# Patient Record
Sex: FEMALE | Race: WHITE | NOT HISPANIC OR LATINO | URBAN - METROPOLITAN AREA
[De-identification: names, ages, dates, MRNs, and addresses within clinical notes are randomized per-mention and may not be internally consistent; named-entity substitution may affect disease eponyms.]

---

## 2023-04-04 ENCOUNTER — TELEPHONE (OUTPATIENT)
Dept: PRIMARY CARE | Facility: CLINIC | Age: 58
End: 2023-04-04
Payer: COMMERCIAL

## 2023-04-04 DIAGNOSIS — G25.81 RESTLESS LEGS: Primary | ICD-10-CM

## 2023-04-04 RX ORDER — CITALOPRAM 40 MG/1
1 TABLET, FILM COATED ORAL DAILY
COMMUNITY
End: 2023-04-17 | Stop reason: SDUPTHER

## 2023-04-04 RX ORDER — BUPROPION HYDROCHLORIDE 150 MG/1
1 TABLET ORAL DAILY
COMMUNITY
End: 2023-04-17 | Stop reason: SDUPTHER

## 2023-04-04 RX ORDER — PRAMIPEXOLE DIHYDROCHLORIDE 0.5 MG/1
0.5 TABLET ORAL NIGHTLY
COMMUNITY
End: 2023-04-06 | Stop reason: SDUPTHER

## 2023-04-04 NOTE — TELEPHONE ENCOUNTER
Name of medication & dose: BUPROPION  MG + CITALOPRAM HYDROBROMIDE 40 MG + PRAMIPEXOLE DIHYDROCHLORIDE 0.5 MG  Quantity & refills requested: 90 + 90 + 90  Amount of medication remaining: ABOUT A WEEK  If out of medication, for how long? N/A    Last office visit: 7/15/22  Future appointment? 7/17/23    Pharmacy verified? YES ([facility]: [id number] [facility])    The patient was informed the requested medication request will be processed within 3 business days unless they are contacted by a staff member to advise otherwise.

## 2023-04-06 PROBLEM — G25.81 RESTLESS LEGS: Status: ACTIVE | Noted: 2023-04-06

## 2023-04-06 PROBLEM — F33.0 MILD EPISODE OF RECURRENT MAJOR DEPRESSIVE DISORDER (CMS-HCC): Status: ACTIVE | Noted: 2023-04-06

## 2023-04-06 PROBLEM — F41.9 ANXIETY: Status: ACTIVE | Noted: 2023-04-06

## 2023-04-06 PROBLEM — M25.512 LEFT SHOULDER PAIN: Status: ACTIVE | Noted: 2023-04-06

## 2023-04-06 PROBLEM — F33.9 RECURRENT MAJOR DEPRESSIVE DISORDER (CMS-HCC): Status: ACTIVE | Noted: 2023-04-06

## 2023-04-06 PROBLEM — E55.9 VITAMIN D DEFICIENCY: Status: ACTIVE | Noted: 2023-04-06

## 2023-04-06 RX ORDER — PRAMIPEXOLE DIHYDROCHLORIDE 0.5 MG/1
0.5 TABLET ORAL NIGHTLY
Qty: 90 TABLET | Refills: 0 | Status: SHIPPED | OUTPATIENT
Start: 2023-04-06 | End: 2023-04-17 | Stop reason: SDUPTHER

## 2023-04-06 NOTE — TELEPHONE ENCOUNTER
The patient by phone.  She really only needs the restless leg medication, have months worth of the other 2.  She declined the request for all 3 because of needing to change pharmacies.  Based on her recent labs and the fact that the restless leg medication was new asked her to please make an appointment in the upcoming month as a problem focused visit.  This is something we talked about at her wellness visit last year and it is not okay to save all of her problems for her wellness visit.  She understood and said she would make an appointment.    Ariadna Barrett MD

## 2023-04-07 ENCOUNTER — APPOINTMENT (OUTPATIENT)
Dept: PRIMARY CARE | Facility: CLINIC | Age: 58
End: 2023-04-07
Payer: COMMERCIAL

## 2023-04-17 ENCOUNTER — OFFICE VISIT (OUTPATIENT)
Dept: PRIMARY CARE | Facility: CLINIC | Age: 58
End: 2023-04-17
Payer: COMMERCIAL

## 2023-04-17 VITALS
SYSTOLIC BLOOD PRESSURE: 131 MMHG | WEIGHT: 187 LBS | HEART RATE: 64 BPM | OXYGEN SATURATION: 96 % | BODY MASS INDEX: 30.18 KG/M2 | DIASTOLIC BLOOD PRESSURE: 77 MMHG

## 2023-04-17 DIAGNOSIS — E55.9 VITAMIN D DEFICIENCY: ICD-10-CM

## 2023-04-17 DIAGNOSIS — G25.81 RESTLESS LEGS: ICD-10-CM

## 2023-04-17 DIAGNOSIS — E78.2 MIXED HYPERLIPIDEMIA: Primary | ICD-10-CM

## 2023-04-17 DIAGNOSIS — F33.0 MILD EPISODE OF RECURRENT MAJOR DEPRESSIVE DISORDER (CMS-HCC): ICD-10-CM

## 2023-04-17 PROCEDURE — 90750 HZV VACC RECOMBINANT IM: CPT | Performed by: FAMILY MEDICINE

## 2023-04-17 PROCEDURE — 99213 OFFICE O/P EST LOW 20 MIN: CPT | Performed by: FAMILY MEDICINE

## 2023-04-17 PROCEDURE — 1036F TOBACCO NON-USER: CPT | Performed by: FAMILY MEDICINE

## 2023-04-17 PROCEDURE — 90471 IMMUNIZATION ADMIN: CPT | Performed by: FAMILY MEDICINE

## 2023-04-17 RX ORDER — BUPROPION HYDROCHLORIDE 150 MG/1
150 TABLET ORAL DAILY
Qty: 90 TABLET | Refills: 3 | Status: SHIPPED | OUTPATIENT
Start: 2023-04-17 | End: 2024-04-24 | Stop reason: SDUPTHER

## 2023-04-17 RX ORDER — PRAMIPEXOLE DIHYDROCHLORIDE 0.5 MG/1
0.5 TABLET ORAL NIGHTLY
Qty: 90 TABLET | Refills: 3 | Status: SHIPPED | OUTPATIENT
Start: 2023-04-17 | End: 2024-04-24 | Stop reason: SDUPTHER

## 2023-04-17 RX ORDER — CITALOPRAM 40 MG/1
40 TABLET, FILM COATED ORAL DAILY
Qty: 90 TABLET | Refills: 3 | Status: SHIPPED | OUTPATIENT
Start: 2023-04-17 | End: 2024-04-24 | Stop reason: SDUPTHER

## 2023-04-17 RX ORDER — ATORVASTATIN CALCIUM 10 MG/1
10 TABLET, FILM COATED ORAL DAILY
Qty: 90 TABLET | Refills: 1 | Status: SHIPPED | OUTPATIENT
Start: 2023-04-17 | End: 2023-08-15 | Stop reason: SDUPTHER

## 2023-04-17 ASSESSMENT — PATIENT HEALTH QUESTIONNAIRE - PHQ9
1. LITTLE INTEREST OR PLEASURE IN DOING THINGS: NOT AT ALL
2. FEELING DOWN, DEPRESSED OR HOPELESS: NOT AT ALL
SUM OF ALL RESPONSES TO PHQ9 QUESTIONS 1 AND 2: 0

## 2023-04-17 NOTE — PROGRESS NOTES
Subjective   Patient ID: Carlos Barrientos is a 58 y.o. female who presents for Hyperlipidemia (High cholesterol follow up).  She had labs 2 months ago.  LDL has continued to increase.  She has not changed her diet much, but is trying to eat less cheese and fried foods.  She feels she eats very healthy.      She needs refills for her depression meds, feels good.  She needs RF on Pramipexole, feels the 0.5 mg dose is working great.      Objective   /77   Pulse 64   Wt 84.8 kg (187 lb)   SpO2 96%   BMI 30.18 kg/m²     Physical Exam  Alert adult woman, NAD  Affect pleasant  Heart RRR no murmur  Lungs CTA bilat    Reviewed February labs and compared to 2022, LDL even higher, Vit D now normal.    Assessment/Plan   Problem List Items Addressed This Visit          Nervous    Restless legs    Relevant Medications    pramipexole (Mirapex) 0.5 mg tablet       Endocrine/Metabolic    Vitamin D deficiency       Other    Mild episode of recurrent major depressive disorder (CMS/HCC)    Relevant Medications    citalopram (CeleXA) 40 mg tablet    buPROPion XL (Wellbutrin XL) 150 mg 24 hr tablet     Other Visit Diagnoses       Mixed hyperlipidemia    -  Primary    Relevant Medications    atorvastatin (Lipitor) 10 mg tablet    Other Relevant Orders    Lipid Panel    Comprehensive Metabolic Panel          Reviewed tx options for hyperlipidemia, will start lipitor low dose.  Labs in 4 months and follow up in August for Wellness    All meds RF

## 2023-04-24 ENCOUNTER — APPOINTMENT (OUTPATIENT)
Dept: PRIMARY CARE | Facility: CLINIC | Age: 58
End: 2023-04-24
Payer: COMMERCIAL

## 2023-07-10 ENCOUNTER — LAB (OUTPATIENT)
Dept: LAB | Facility: LAB | Age: 58
End: 2023-07-10
Payer: COMMERCIAL

## 2023-07-10 DIAGNOSIS — E78.2 MIXED HYPERLIPIDEMIA: ICD-10-CM

## 2023-07-10 LAB
ALANINE AMINOTRANSFERASE (SGPT) (U/L) IN SER/PLAS: 21 U/L (ref 7–45)
ALBUMIN (G/DL) IN SER/PLAS: 4.4 G/DL (ref 3.4–5)
ALKALINE PHOSPHATASE (U/L) IN SER/PLAS: 52 U/L (ref 33–110)
ANION GAP IN SER/PLAS: 11 MMOL/L (ref 10–20)
ASPARTATE AMINOTRANSFERASE (SGOT) (U/L) IN SER/PLAS: 16 U/L (ref 9–39)
BILIRUBIN TOTAL (MG/DL) IN SER/PLAS: 0.4 MG/DL (ref 0–1.2)
CALCIUM (MG/DL) IN SER/PLAS: 9.3 MG/DL (ref 8.6–10.3)
CARBON DIOXIDE, TOTAL (MMOL/L) IN SER/PLAS: 30 MMOL/L (ref 21–32)
CHLORIDE (MMOL/L) IN SER/PLAS: 104 MMOL/L (ref 98–107)
CHOLESTEROL (MG/DL) IN SER/PLAS: 209 MG/DL (ref 0–199)
CHOLESTEROL IN HDL (MG/DL) IN SER/PLAS: 72.1 MG/DL
CHOLESTEROL/HDL RATIO: 2.9
CREATININE (MG/DL) IN SER/PLAS: 0.97 MG/DL (ref 0.5–1.05)
GFR FEMALE: 68 ML/MIN/1.73M2
GLUCOSE (MG/DL) IN SER/PLAS: 117 MG/DL (ref 74–99)
LDL: 115 MG/DL (ref 0–99)
POTASSIUM (MMOL/L) IN SER/PLAS: 4.6 MMOL/L (ref 3.5–5.3)
PROTEIN TOTAL: 6.9 G/DL (ref 6.4–8.2)
SODIUM (MMOL/L) IN SER/PLAS: 140 MMOL/L (ref 136–145)
TRIGLYCERIDE (MG/DL) IN SER/PLAS: 108 MG/DL (ref 0–149)
UREA NITROGEN (MG/DL) IN SER/PLAS: 14 MG/DL (ref 6–23)
VLDL: 22 MG/DL (ref 0–40)

## 2023-07-10 PROCEDURE — 80061 LIPID PANEL: CPT

## 2023-07-10 PROCEDURE — 80053 COMPREHEN METABOLIC PANEL: CPT

## 2023-07-10 PROCEDURE — 36415 COLL VENOUS BLD VENIPUNCTURE: CPT

## 2023-07-17 ENCOUNTER — APPOINTMENT (OUTPATIENT)
Dept: PRIMARY CARE | Facility: CLINIC | Age: 58
End: 2023-07-17
Payer: COMMERCIAL

## 2023-08-15 ENCOUNTER — OFFICE VISIT (OUTPATIENT)
Dept: PRIMARY CARE | Facility: CLINIC | Age: 58
End: 2023-08-15
Payer: COMMERCIAL

## 2023-08-15 VITALS
OXYGEN SATURATION: 95 % | HEIGHT: 67 IN | WEIGHT: 189 LBS | DIASTOLIC BLOOD PRESSURE: 78 MMHG | BODY MASS INDEX: 29.66 KG/M2 | SYSTOLIC BLOOD PRESSURE: 127 MMHG | HEART RATE: 72 BPM

## 2023-08-15 DIAGNOSIS — Z12.11 COLON CANCER SCREENING: ICD-10-CM

## 2023-08-15 DIAGNOSIS — L30.9 ECZEMA, UNSPECIFIED TYPE: ICD-10-CM

## 2023-08-15 DIAGNOSIS — F41.8 SITUATIONAL ANXIETY: ICD-10-CM

## 2023-08-15 DIAGNOSIS — R06.83 SNORING: ICD-10-CM

## 2023-08-15 DIAGNOSIS — Z00.00 WELL ADULT EXAM: Primary | ICD-10-CM

## 2023-08-15 DIAGNOSIS — N94.10 DYSPAREUNIA, FEMALE: ICD-10-CM

## 2023-08-15 DIAGNOSIS — E78.2 MIXED HYPERLIPIDEMIA: ICD-10-CM

## 2023-08-15 DIAGNOSIS — D17.0 LIPOMA OF HEAD: ICD-10-CM

## 2023-08-15 DIAGNOSIS — Z12.31 ENCOUNTER FOR SCREENING MAMMOGRAM FOR MALIGNANT NEOPLASM OF BREAST: ICD-10-CM

## 2023-08-15 PROCEDURE — 1036F TOBACCO NON-USER: CPT | Performed by: FAMILY MEDICINE

## 2023-08-15 PROCEDURE — 99213 OFFICE O/P EST LOW 20 MIN: CPT | Performed by: FAMILY MEDICINE

## 2023-08-15 PROCEDURE — 99396 PREV VISIT EST AGE 40-64: CPT | Performed by: FAMILY MEDICINE

## 2023-08-15 RX ORDER — ATORVASTATIN CALCIUM 10 MG/1
10 TABLET, FILM COATED ORAL DAILY
Qty: 90 TABLET | Refills: 3 | Status: SHIPPED | OUTPATIENT
Start: 2023-08-15 | End: 2024-08-09

## 2023-08-15 RX ORDER — HYDROXYZINE HYDROCHLORIDE 10 MG/1
10 TABLET, FILM COATED ORAL EVERY 8 HOURS PRN
Qty: 30 TABLET | Refills: 0 | Status: SHIPPED | OUTPATIENT
Start: 2023-08-15 | End: 2023-09-14

## 2023-08-15 ASSESSMENT — PROMIS GLOBAL HEALTH SCALE
RATE_PHYSICAL_HEALTH: VERY GOOD
RATE_AVERAGE_PAIN: 0
CARRYOUT_PHYSICAL_ACTIVITIES: COMPLETELY
RATE_AVERAGE_FATIGUE: MILD
RATE_QUALITY_OF_LIFE: EXCELLENT
RATE_GENERAL_HEALTH: VERY GOOD
CARRYOUT_SOCIAL_ACTIVITIES: VERY GOOD
RATE_SOCIAL_SATISFACTION: VERY GOOD
EMOTIONAL_PROBLEMS: ALWAYS
RATE_MENTAL_HEALTH: FAIR

## 2023-08-15 ASSESSMENT — ENCOUNTER SYMPTOMS
LOSS OF SENSATION IN FEET: 0
DEPRESSION: 1
OCCASIONAL FEELINGS OF UNSTEADINESS: 0

## 2023-08-15 NOTE — PROGRESS NOTES
"  Subjective   Patient ID: Carlos Barrientos is a 58 y.o. female who presents for Annual Exam.    Past Medical, Surgical, Social and Family Hx reviewed-Yes    Any acute complaints?    Small eczema spot under her left arm, itchy  Sometimes sex has been painful, little bit of bleeding.    Any chronic issues addressed today or Rx refills needed?    Reviewed, No Rxs needed    Last pap 2020  Last Mammogram June 2023  Colon CA screening Hx age 51, normal but mom has polyps and GPa had colon CA  Labs Reviewed recent  Up to date on immunizations yes  Dentist in the past year yes    Menstruation postmenopause  Sexual Activity some discomfort        PE:  /78 (BP Location: Left arm)   Pulse 72   Ht 1.702 m (5' 7\")   Wt 85.7 kg (189 lb)   SpO2 95%   BMI 29.60 kg/m²   Alert adult woman, NAD  HEENT clear  Neck supple, No LAD  Heart RRR no murmur  Lungs CTA bilat  Abdomen benign, normal BS, soft NT/ND  Skin warm, dry, clear    One large palpable mass on scalp c/w lipoma  Neuro grossly normal, gait WNL  Affect pleasant and appropriate, memory and judgement WNL        Assessment/Plan   Problem List Items Addressed This Visit    None  Visit Diagnoses       Well adult exam    -  Primary    Colon cancer screening        Relevant Orders    Colonoscopy    Mixed hyperlipidemia        Relevant Medications    atorvastatin (Lipitor) 10 mg tablet    Eczema, unspecified type        Relevant Orders    Referral to Dermatology    Lipoma of head        Relevant Orders    Referral to Dermatology    Encounter for screening mammogram for malignant neoplasm of breast        Relevant Orders    BI mammo bilateral screening tomosynthesis    Snoring        Relevant Orders    Home sleep apnea test (HSAT)    Dyspareunia, female        Relevant Medications    estrogens, conjugated, (Premarin) vaginal cream    Situational anxiety        Relevant Medications    hydrOXYzine HCL (Atarax) 10 mg tablet               "

## 2023-10-13 ENCOUNTER — TELEPHONE (OUTPATIENT)
Dept: PRIMARY CARE | Facility: CLINIC | Age: 58
End: 2023-10-13
Payer: COMMERCIAL

## 2023-10-13 NOTE — TELEPHONE ENCOUNTER
Type of form: Immunization - Influenza and Covid 19     Received via: fax    [facility]     Form placed: in your box

## 2024-01-26 ENCOUNTER — OFFICE VISIT (OUTPATIENT)
Dept: DERMATOLOGY | Facility: CLINIC | Age: 59
End: 2024-01-26
Payer: COMMERCIAL

## 2024-01-26 DIAGNOSIS — L82.0 INFLAMED SEBORRHEIC KERATOSIS: ICD-10-CM

## 2024-01-26 DIAGNOSIS — D48.5 NEOPLASM OF UNCERTAIN BEHAVIOR OF SKIN: Primary | ICD-10-CM

## 2024-01-26 DIAGNOSIS — R20.8 SKIN PAIN: ICD-10-CM

## 2024-01-26 DIAGNOSIS — L82.1 SEBORRHEIC KERATOSIS: ICD-10-CM

## 2024-01-26 PROCEDURE — 99203 OFFICE O/P NEW LOW 30 MIN: CPT | Performed by: DERMATOLOGY

## 2024-01-26 PROCEDURE — 88304 TISSUE EXAM BY PATHOLOGIST: CPT | Performed by: DERMATOLOGY

## 2024-01-26 PROCEDURE — 11104 PUNCH BX SKIN SINGLE LESION: CPT | Performed by: DERMATOLOGY

## 2024-01-26 PROCEDURE — 1036F TOBACCO NON-USER: CPT | Performed by: DERMATOLOGY

## 2024-01-26 PROCEDURE — 17110 DESTRUCTION B9 LES UP TO 14: CPT | Performed by: DERMATOLOGY

## 2024-01-26 RX ORDER — ESTRADIOL 1 MG/1
TABLET ORAL
COMMUNITY
Start: 2024-01-04

## 2024-01-26 RX ORDER — PROGESTERONE 100 MG/1
CAPSULE ORAL
COMMUNITY
Start: 2024-01-04

## 2024-01-26 ASSESSMENT — DERMATOLOGY QUALITY OF LIFE (QOL) ASSESSMENT
RATE HOW BOTHERED YOU ARE BY EFFECTS OF YOUR SKIN PROBLEMS ON YOUR ACTIVITIES (EG, GOING OUT, ACCOMPLISHING WHAT YOU WANT, WORK ACTIVITIES OR YOUR RELATIONSHIPS WITH OTHERS): 0 - NEVER BOTHERED
WHAT SINGLE SKIN CONDITION LISTED BELOW IS THE PATIENT ANSWERING THE QUALITY-OF-LIFE ASSESSMENT QUESTIONS ABOUT: NONE OF THE ABOVE
DATE THE QUALITY-OF-LIFE ASSESSMENT WAS COMPLETED: 66865
ARE THERE EXCLUSIONS OR EXCEPTIONS FOR THE QUALITY OF LIFE ASSESSMENT: NO
RATE HOW BOTHERED YOU ARE BY SYMPTOMS OF YOUR SKIN PROBLEM (EG, ITCHING, STINGING BURNING, HURTING OR SKIN IRRITATION): 0 - NEVER BOTHERED
RATE HOW BOTHERED YOU ARE BY EFFECTS OF YOUR SKIN PROBLEMS ON YOUR ACTIVITIES (EG, GOING OUT, ACCOMPLISHING WHAT YOU WANT, WORK ACTIVITIES OR YOUR RELATIONSHIPS WITH OTHERS): 0 - NEVER BOTHERED
RATE HOW BOTHERED YOU ARE BY EFFECTS OF YOUR SKIN PROBLEMS ON YOUR ACTIVITIES (EG, GOING OUT, ACCOMPLISHING WHAT YOU WANT, WORK ACTIVITIES OR YOUR RELATIONSHIPS WITH OTHERS): 0 - NEVER BOTHERED
WHAT SINGLE SKIN CONDITION LISTED BELOW IS THE PATIENT ANSWERING THE QUALITY-OF-LIFE ASSESSMENT QUESTIONS ABOUT: NONE OF THE ABOVE
RATE HOW EMOTIONALLY BOTHERED YOU ARE BY YOUR SKIN PROBLEM (FOR EXAMPLE, WORRY, EMBARRASSMENT, FRUSTRATION): 0 - NEVER BOTHERED
WHAT SINGLE SKIN CONDITION LISTED BELOW IS THE PATIENT ANSWERING THE QUALITY-OF-LIFE ASSESSMENT QUESTIONS ABOUT: NONE OF THE ABOVE
RATE HOW BOTHERED YOU ARE BY SYMPTOMS OF YOUR SKIN PROBLEM (EG, ITCHING, STINGING BURNING, HURTING OR SKIN IRRITATION): 2
RATE HOW EMOTIONALLY BOTHERED YOU ARE BY YOUR SKIN PROBLEM (FOR EXAMPLE, WORRY, EMBARRASSMENT, FRUSTRATION): 0 - NEVER BOTHERED
RATE HOW BOTHERED YOU ARE BY SYMPTOMS OF YOUR SKIN PROBLEM (EG, ITCHING, STINGING BURNING, HURTING OR SKIN IRRITATION): 0 - NEVER BOTHERED
RATE HOW EMOTIONALLY BOTHERED YOU ARE BY YOUR SKIN PROBLEM (FOR EXAMPLE, WORRY, EMBARRASSMENT, FRUSTRATION): 2

## 2024-01-26 ASSESSMENT — DERMATOLOGY PATIENT ASSESSMENT
DO YOU HAVE ANY NEW OR CHANGING LESIONS: YES
ARE YOU AN ORGAN TRANSPLANT RECIPIENT: NO
DO YOU USE A TANNING BED: NO
DO YOU USE SUNSCREEN: DAILY

## 2024-01-26 ASSESSMENT — PATIENT GLOBAL ASSESSMENT (PGA): PATIENT GLOBAL ASSESSMENT: PATIENT GLOBAL ASSESSMENT:  2 - MILD

## 2024-01-26 ASSESSMENT — ITCH NUMERIC RATING SCALE: HOW SEVERE IS YOUR ITCHING?: 3

## 2024-01-26 NOTE — PROGRESS NOTES
Lynne Barrientos is a 58 y.o. female who presents for the following: Cyst (Pt here for cyst on scalp. Pt has had for a few years. Cyst is enlarging and painful.) and Skin Check (Pt here to have lesions on back and chest checked. Lesions on chest are raised and itchy.).     Review of Systems:  No other skin or systemic complaints other than what is documented elsewhere in the note.    The following portions of the chart were reviewed this encounter and updated as appropriate:          Skin Cancer History  No skin cancer on file.      Specialty Problems    None       Objective   Well appearing patient in no apparent distress; mood and affect are within normal limits.    A focused skin examination was performed of the chest, scalp, back. All findings within normal limits unless otherwise noted below.    Assessment/Plan   1. Neoplasm of uncertain behavior of skin  Mid Frontal Scalp  2cm mobile nodule          Lesion biopsy  Type of biopsy: punch    Informed consent: discussed and consent obtained    Timeout: patient name, date of birth, surgical site, and procedure verified    Procedure prep:  Patient was prepped and draped  Anesthesia: the lesion was anesthetized in a standard fashion    Anesthetic:  1% lidocaine w/ epinephrine 1-100,000 local infiltration  Punch size:  6 mm  Suture size:  4-0  Suture type: Prolene (polypropylene)    Suture removal (days):  10  Hemostasis achieved with: suture    Outcome: patient tolerated procedure well    Post-procedure details: sterile dressing applied and wound care instructions given    Dressing type: bandage and petrolatum      Staff Communication: Dermatology Local Anesthesia: 1 % Lidocaine / Epinephrine - Amount:3cc    Specimen 1 - Dermatopathology- DERM LAB  Differential Diagnosis: pilar cyst  Check Margins Yes/No?:    Comments:    Dermpath Lab: Routine Histopathology (formalin-fixed tissue)    Pilarcysts are benign, encapsulated growths of unknown cause.   These  "lesions can become enlarged, inflamed, painful and drain.   These lesions can be removed surgically, however this procedure require, local anesthesia is used and requires stitches  Following removal the lesion will be traded for a scar.   Risks and benefits of removal include but are limited to: incomplete removal, recurrence, keloid (thickened, itchy or painful scar) formation, wound dehiscence (opening) and need to limit activity for 10-14 days following procedure.      Related Procedures  Follow Up In Dermatology - Nurse Visit    2. Inflamed seborrheic keratosis (5)  Chest - Medial (Center) (2), Left Abdomen (side) - Upper, Left Breast, Right Breast  pink and brown \"stuck on\" verrucous scaly papule with surrounding erythema    The benign nature of these skin lesions reviewed, reassure provided. Due to symptomatic nature, patient offered treatment with LN2 vs. Curettage. Patient elected for LN2. The risks and benefits of LN2 were reviewed including incomplete removal, crusting, blister hypo and/or hyperpigmentation, scarring and recurrence.  See procedure note.         Destr of lesion - Chest - Medial (Center) (2), Left Abdomen (side) - Upper, Left Breast, Right Breast  Complexity: simple    Destruction method: cryotherapy    Informed consent: discussed and consent obtained    Lesion destroyed using liquid nitrogen: Yes    Cryotherapy cycles:  2  Outcome: patient tolerated procedure well with no complications    Post-procedure details: wound care instructions given      3. Seborrheic keratosis (2)  Left Lower Back, Right Upper Back  Brown, tan waxy macules and stuck on appearing papules and plaques    The benign nature of these skin lesions reviewed, reassure provided and no further treatment needed at this time.   These lesions can be removed, if symptomatic (itching, bleeding, rubbing on clothing, painful), otherwise removal is considered cosmetic.     Follow up for suture removal      "

## 2024-01-30 LAB
LABORATORY COMMENT REPORT: NORMAL
PATH REPORT.FINAL DX SPEC: NORMAL
PATH REPORT.GROSS SPEC: NORMAL
PATH REPORT.MICROSCOPIC SPEC OTHER STN: NORMAL
PATH REPORT.RELEVANT HX SPEC: NORMAL
PATH REPORT.TOTAL CANCER: NORMAL

## 2024-02-05 ENCOUNTER — CLINICAL SUPPORT (OUTPATIENT)
Dept: DERMATOLOGY | Facility: CLINIC | Age: 59
End: 2024-02-05
Payer: COMMERCIAL

## 2024-02-05 DIAGNOSIS — Z48.02 ENCOUNTER FOR REMOVAL OF SUTURES: ICD-10-CM

## 2024-02-05 DIAGNOSIS — D48.5 NEOPLASM OF UNCERTAIN BEHAVIOR OF SKIN: ICD-10-CM

## 2024-02-05 PROCEDURE — 99024 POSTOP FOLLOW-UP VISIT: CPT | Performed by: DERMATOLOGY

## 2024-02-05 ASSESSMENT — DERMATOLOGY QUALITY OF LIFE (QOL) ASSESSMENT
RATE HOW BOTHERED YOU ARE BY EFFECTS OF YOUR SKIN PROBLEMS ON YOUR ACTIVITIES (EG, GOING OUT, ACCOMPLISHING WHAT YOU WANT, WORK ACTIVITIES OR YOUR RELATIONSHIPS WITH OTHERS): 0 - NEVER BOTHERED
RATE HOW EMOTIONALLY BOTHERED YOU ARE BY YOUR SKIN PROBLEM (FOR EXAMPLE, WORRY, EMBARRASSMENT, FRUSTRATION): 0 - NEVER BOTHERED
RATE HOW BOTHERED YOU ARE BY EFFECTS OF YOUR SKIN PROBLEMS ON YOUR ACTIVITIES (EG, GOING OUT, ACCOMPLISHING WHAT YOU WANT, WORK ACTIVITIES OR YOUR RELATIONSHIPS WITH OTHERS): 0 - NEVER BOTHERED
RATE HOW EMOTIONALLY BOTHERED YOU ARE BY YOUR SKIN PROBLEM (FOR EXAMPLE, WORRY, EMBARRASSMENT, FRUSTRATION): 0 - NEVER BOTHERED
RATE HOW BOTHERED YOU ARE BY SYMPTOMS OF YOUR SKIN PROBLEM (EG, ITCHING, STINGING BURNING, HURTING OR SKIN IRRITATION): 0 - NEVER BOTHERED
RATE HOW BOTHERED YOU ARE BY SYMPTOMS OF YOUR SKIN PROBLEM (EG, ITCHING, STINGING BURNING, HURTING OR SKIN IRRITATION): 0 - NEVER BOTHERED
WHAT SINGLE SKIN CONDITION LISTED BELOW IS THE PATIENT ANSWERING THE QUALITY-OF-LIFE ASSESSMENT QUESTIONS ABOUT: NONE OF THE ABOVE
WHAT SINGLE SKIN CONDITION LISTED BELOW IS THE PATIENT ANSWERING THE QUALITY-OF-LIFE ASSESSMENT QUESTIONS ABOUT: NONE OF THE ABOVE

## 2024-02-05 ASSESSMENT — PATIENT GLOBAL ASSESSMENT (PGA): WHAT IS THE PGA: PATIENT GLOBAL ASSESSMENT:  1 - CLEAR

## 2024-02-05 NOTE — PROGRESS NOTES
"Subjective     Carlos Barrientos is a 58 y.o. female who presents for the following: Suture / Staple Removal.     Review of Systems:  No other skin or systemic complaints other than what is documented elsewhere in the note.    The following portions of the chart were reviewed this encounter and updated as appropriate:          Skin Cancer History  No skin cancer on file.      Specialty Problems    None       Objective   Well appearing patient in no apparent distress; mood and affect are within normal limits.    A focused skin examination was performed. All findings within normal limits unless otherwise noted below.    Assessment/Plan   1. Neoplasm of uncertain behavior of skin    Related Procedures  Follow Up In Dermatology - Nurse Visit    2. Encounter for removal of sutures  Mid Frontal Scalp    Suture Removal - Mid Frontal Scalp    Performed by: Lyle Sanford MA  Authorized by: Yoselyn Dangelo MD  Consent: Verbal consent obtained.  Risks and benefits: risks, benefits and alternatives were discussed  Consent given by: patient  Patient understanding: patient states understanding of the procedure being performed  Patient identity confirmed: verbally with patient  Time out: Immediately prior to procedure a \"time out\" was called to verify the correct patient, procedure, equipment, support staff and site/side marked as required.  Wound Appearance: clean  Post-removal: dressing applied  Facility: sutures placed in this facility  Patient tolerance: patient tolerated the procedure well with no immediate complications              "

## 2024-04-24 DIAGNOSIS — G25.81 RESTLESS LEGS: ICD-10-CM

## 2024-04-24 DIAGNOSIS — F33.0 MILD EPISODE OF RECURRENT MAJOR DEPRESSIVE DISORDER (CMS-HCC): ICD-10-CM

## 2024-04-25 RX ORDER — CITALOPRAM 40 MG/1
40 TABLET, FILM COATED ORAL DAILY
Qty: 90 TABLET | Refills: 1 | Status: SHIPPED | OUTPATIENT
Start: 2024-04-25

## 2024-04-25 RX ORDER — PRAMIPEXOLE DIHYDROCHLORIDE 0.5 MG/1
0.5 TABLET ORAL NIGHTLY
Qty: 90 TABLET | Refills: 1 | Status: SHIPPED | OUTPATIENT
Start: 2024-04-25

## 2024-04-25 RX ORDER — BUPROPION HYDROCHLORIDE 150 MG/1
150 TABLET ORAL DAILY
Qty: 90 TABLET | Refills: 1 | Status: SHIPPED | OUTPATIENT
Start: 2024-04-25

## 2024-08-16 ENCOUNTER — APPOINTMENT (OUTPATIENT)
Dept: PRIMARY CARE | Facility: CLINIC | Age: 59
End: 2024-08-16
Payer: COMMERCIAL

## 2024-08-16 VITALS
SYSTOLIC BLOOD PRESSURE: 130 MMHG | HEART RATE: 86 BPM | OXYGEN SATURATION: 95 % | WEIGHT: 201 LBS | HEIGHT: 67 IN | BODY MASS INDEX: 31.55 KG/M2 | DIASTOLIC BLOOD PRESSURE: 78 MMHG

## 2024-08-16 DIAGNOSIS — Z00.00 WELL ADULT EXAM: Primary | ICD-10-CM

## 2024-08-16 DIAGNOSIS — Z12.11 SCREENING FOR COLON CANCER: ICD-10-CM

## 2024-08-16 DIAGNOSIS — Z13.1 SCREENING FOR DIABETES MELLITUS: ICD-10-CM

## 2024-08-16 DIAGNOSIS — R63.5 WEIGHT GAIN: ICD-10-CM

## 2024-08-16 DIAGNOSIS — F33.0 MILD EPISODE OF RECURRENT MAJOR DEPRESSIVE DISORDER (CMS-HCC): ICD-10-CM

## 2024-08-16 DIAGNOSIS — E78.2 MIXED HYPERLIPIDEMIA: ICD-10-CM

## 2024-08-16 DIAGNOSIS — N94.10 DYSPAREUNIA, FEMALE: ICD-10-CM

## 2024-08-16 DIAGNOSIS — G25.81 RESTLESS LEGS: ICD-10-CM

## 2024-08-16 PROCEDURE — 90715 TDAP VACCINE 7 YRS/> IM: CPT | Performed by: FAMILY MEDICINE

## 2024-08-16 PROCEDURE — 3008F BODY MASS INDEX DOCD: CPT | Performed by: FAMILY MEDICINE

## 2024-08-16 PROCEDURE — 90471 IMMUNIZATION ADMIN: CPT | Performed by: FAMILY MEDICINE

## 2024-08-16 PROCEDURE — 99396 PREV VISIT EST AGE 40-64: CPT | Performed by: FAMILY MEDICINE

## 2024-08-16 RX ORDER — BUPROPION HYDROCHLORIDE 150 MG/1
150 TABLET ORAL DAILY
Qty: 90 TABLET | Refills: 3 | Status: SHIPPED | OUTPATIENT
Start: 2024-08-16

## 2024-08-16 RX ORDER — ATORVASTATIN CALCIUM 10 MG/1
10 TABLET, FILM COATED ORAL DAILY
Qty: 90 TABLET | Refills: 3 | Status: SHIPPED | OUTPATIENT
Start: 2024-08-16 | End: 2025-08-11

## 2024-08-16 RX ORDER — PRAMIPEXOLE DIHYDROCHLORIDE 0.5 MG/1
0.5 TABLET ORAL NIGHTLY
Qty: 90 TABLET | Refills: 3 | Status: SHIPPED | OUTPATIENT
Start: 2024-08-16

## 2024-08-16 RX ORDER — CITALOPRAM 40 MG/1
40 TABLET, FILM COATED ORAL DAILY
Qty: 90 TABLET | Refills: 3 | Status: SHIPPED | OUTPATIENT
Start: 2024-08-16

## 2024-08-16 ASSESSMENT — PROMIS GLOBAL HEALTH SCALE
RATE_SOCIAL_SATISFACTION: VERY GOOD
CARRYOUT_SOCIAL_ACTIVITIES: EXCELLENT
RATE_QUALITY_OF_LIFE: VERY GOOD
RATE_AVERAGE_FATIGUE: MILD
RATE_MENTAL_HEALTH: VERY GOOD
RATE_AVERAGE_PAIN: 0
CARRYOUT_PHYSICAL_ACTIVITIES: COMPLETELY
RATE_GENERAL_HEALTH: VERY GOOD
EMOTIONAL_PROBLEMS: RARELY
RATE_PHYSICAL_HEALTH: VERY GOOD

## 2024-08-16 ASSESSMENT — PATIENT HEALTH QUESTIONNAIRE - PHQ9
2. FEELING DOWN, DEPRESSED OR HOPELESS: NOT AT ALL
1. LITTLE INTEREST OR PLEASURE IN DOING THINGS: NOT AT ALL
SUM OF ALL RESPONSES TO PHQ9 QUESTIONS 1 AND 2: 0

## 2024-08-16 NOTE — PROGRESS NOTES
"  Subjective   Patient ID: Carlos Barrientos is a 59 y.o. female who presents for Annual Exam (Patient is here for annual physical. She sees OBGYN for paps. ).    Past Medical, Surgical, Social and Family Hx reviewed-Yes    Any acute complaints?    Nothing new    Any chronic issues addressed today or Rx refills needed?    She needs multiple Rxs.  Mirapex is working well.  She currently wants to stay on wellbutrin and celexa.     Last pap 2020  Last Mammogram 2023  Colon CA screening Hx ordered cologuard  Labs reviewed and ordered  Up to date on immunizations yes, but needs TDap  Dentist in the past year yes    Menstruation postmenopausal  Sexual Activity better with premarin cream        PE:  /78   Pulse 86   Ht 1.689 m (5' 6.5\")   Wt 91.2 kg (201 lb)   LMP  (LMP Unknown)   SpO2 95%   BMI 31.96 kg/m²   Alert adult woman, NAD  HEENT clear  Neck supple, No LAD  Heart RRR no murmur  Lungs CTA bilat  Abdomen benign, normal BS, soft NT/ND  Skin warm, dry, clear  Neuro grossly normal, gait WNL  Affect pleasant and appropriate, memory and judgement WNL        Assessment/Plan   Problem List Items Addressed This Visit             ICD-10-CM    Mild episode of recurrent major depressive disorder (CMS-HCC) F33.0    Relevant Medications    buPROPion XL (Wellbutrin XL) 150 mg 24 hr tablet    citalopram (CeleXA) 40 mg tablet    Restless legs G25.81    Relevant Medications    pramipexole (Mirapex) 0.5 mg tablet     Other Visit Diagnoses         Codes    Well adult exam    -  Primary Z00.00    Mixed hyperlipidemia     E78.2    Relevant Medications    atorvastatin (Lipitor) 10 mg tablet    Other Relevant Orders    Lipid Panel    Dyspareunia, female     N94.10    Relevant Medications    estrogens, conjugated, (Premarin) vaginal cream    Screening for colon cancer     Z12.11    Relevant Orders    Cologuard® colon cancer screening    Screening for diabetes mellitus     Z13.1    Relevant Orders    Hemoglobin A1C    Weight gain   "   R63.5          Reviewed HM and vaccines

## 2024-08-31 ENCOUNTER — LAB (OUTPATIENT)
Dept: LAB | Facility: LAB | Age: 59
End: 2024-08-31
Payer: COMMERCIAL

## 2024-08-31 DIAGNOSIS — Z13.1 SCREENING FOR DIABETES MELLITUS: ICD-10-CM

## 2024-08-31 DIAGNOSIS — E78.2 MIXED HYPERLIPIDEMIA: ICD-10-CM

## 2024-08-31 LAB
CHOLEST SERPL-MCNC: 218 MG/DL (ref 0–199)
CHOLESTEROL/HDL RATIO: 3.4
EST. AVERAGE GLUCOSE BLD GHB EST-MCNC: 111 MG/DL
HBA1C MFR BLD: 5.5 %
HDLC SERPL-MCNC: 64.8 MG/DL
LDLC SERPL CALC-MCNC: 123 MG/DL
NON HDL CHOLESTEROL: 153 MG/DL (ref 0–149)
TRIGL SERPL-MCNC: 150 MG/DL (ref 0–149)
VLDL: 30 MG/DL (ref 0–40)

## 2024-08-31 PROCEDURE — 80061 LIPID PANEL: CPT

## 2024-08-31 PROCEDURE — 83036 HEMOGLOBIN GLYCOSYLATED A1C: CPT

## 2024-08-31 PROCEDURE — 36415 COLL VENOUS BLD VENIPUNCTURE: CPT

## 2024-09-02 LAB — NONINV COLON CA DNA+OCC BLD SCRN STL QL: NEGATIVE

## 2024-09-12 ENCOUNTER — APPOINTMENT (OUTPATIENT)
Dept: PRIMARY CARE | Facility: CLINIC | Age: 59
End: 2024-09-12
Payer: COMMERCIAL

## 2024-09-12 VITALS
BODY MASS INDEX: 32.43 KG/M2 | SYSTOLIC BLOOD PRESSURE: 128 MMHG | WEIGHT: 204 LBS | DIASTOLIC BLOOD PRESSURE: 81 MMHG | OXYGEN SATURATION: 96 % | HEART RATE: 78 BPM

## 2024-09-12 DIAGNOSIS — E66.9 OBESITY (BMI 30.0-34.9): Primary | ICD-10-CM

## 2024-09-12 PROCEDURE — 1036F TOBACCO NON-USER: CPT | Performed by: FAMILY MEDICINE

## 2024-09-12 PROCEDURE — 99213 OFFICE O/P EST LOW 20 MIN: CPT | Performed by: FAMILY MEDICINE

## 2024-09-12 NOTE — PROGRESS NOTES
Subjective   Patient ID: Carlos Barrientos is a 59 y.o. female who presents for Weight Loss (Pt states she is here to discuss weight loss options. No other concerns at the moment.).    Wt age 19 was about 124 lb lbs.  After she had children she got back down to 135-140.  During perimenopause she started gaining wt.  She stopped menses around age 55.   3 years ago.  She has not tried formal dieting.    She currently exercises with walking 3 days a week.      She feels like she just keeps gaining, would like to try Rx meds.  Last year she was 15 lbs lighter than this.      Histories reviewed      Objective   /81   Pulse 78   Wt 92.5 kg (204 lb)   LMP  (LMP Unknown)   SpO2 96%   BMI 32.43 kg/m²    Physical Exam    Alert adult, NAD  Affect pleasant  Heart RRR no murmur  Lungs CTA bilat      Problem List Items Addressed This Visit    None  Visit Diagnoses         Codes    Obesity (BMI 30.0-34.9)    -  Primary E66.9    Relevant Medications    semaglutide 0.25 mg or 0.5 mg (2 mg/3 mL) pen injector            Reviewed wt loss Rx options.  Discussed possible side effects of the semaglutides, how dosing starts/progresses.  Pt knows that early studies show significant chance to gain back any wt lost, after stopping the meds.  If not covered and one attempt at prior auth ineffective, pt will be responsible for calling her ins to see what her covered options are, if any.  If the Rx is covered, call in 4 weeks to let me know if prefers to stay same dose or go up a step.  Then I will do 2 months Rx and follow up in person in 3 months.  Call sooner if any problems.

## 2024-09-13 ENCOUNTER — PATIENT MESSAGE (OUTPATIENT)
Dept: PRIMARY CARE | Facility: CLINIC | Age: 59
End: 2024-09-13
Payer: COMMERCIAL

## 2024-09-13 DIAGNOSIS — E66.9 OBESITY (BMI 30.0-34.9): Primary | ICD-10-CM

## 2024-09-16 NOTE — PATIENT COMMUNICATION
Pt called today stating that she spoke to her insurance carrier. Pt stated insurance would could Wegovy or Zepbound as an alternative for Ozempic.

## 2024-09-17 NOTE — ADDENDUM NOTE
Addended by: DONALD STARKEY on: 9/17/2024 04:35 PM     Modules accepted: Orders     minimal assistance

## 2024-10-14 DIAGNOSIS — E66.811 OBESITY (BMI 30.0-34.9): ICD-10-CM

## 2024-10-14 NOTE — TELEPHONE ENCOUNTER
Patient was asked to call after 1 month of starting meds.  She is happy with the dose she is on. Asking for her remainder 2 months to be sent. Last appointment 9/12/24.  Her next dose is supposed to taken 10/18/24

## 2024-11-13 DIAGNOSIS — E66.811 OBESITY (BMI 30.0-34.9): ICD-10-CM

## 2024-11-13 RX ORDER — TIRZEPATIDE 2.5 MG/.5ML
2.5 INJECTION, SOLUTION SUBCUTANEOUS
Qty: 2 ML | Refills: 0 | Status: SHIPPED | OUTPATIENT
Start: 2024-11-17

## 2024-12-13 ENCOUNTER — APPOINTMENT (OUTPATIENT)
Dept: PRIMARY CARE | Facility: CLINIC | Age: 59
End: 2024-12-13
Payer: COMMERCIAL

## 2024-12-19 ENCOUNTER — APPOINTMENT (OUTPATIENT)
Dept: PRIMARY CARE | Facility: CLINIC | Age: 59
End: 2024-12-19
Payer: COMMERCIAL

## 2024-12-19 VITALS
HEIGHT: 67 IN | WEIGHT: 195 LBS | SYSTOLIC BLOOD PRESSURE: 128 MMHG | BODY MASS INDEX: 30.61 KG/M2 | DIASTOLIC BLOOD PRESSURE: 79 MMHG | OXYGEN SATURATION: 97 % | HEART RATE: 67 BPM

## 2024-12-19 DIAGNOSIS — E66.811 OBESITY (BMI 30.0-34.9): ICD-10-CM

## 2024-12-19 PROCEDURE — 99213 OFFICE O/P EST LOW 20 MIN: CPT | Performed by: FAMILY MEDICINE

## 2024-12-19 PROCEDURE — 1036F TOBACCO NON-USER: CPT | Performed by: FAMILY MEDICINE

## 2024-12-19 PROCEDURE — 3008F BODY MASS INDEX DOCD: CPT | Performed by: FAMILY MEDICINE

## 2024-12-19 ASSESSMENT — PATIENT HEALTH QUESTIONNAIRE - PHQ9
SUM OF ALL RESPONSES TO PHQ9 QUESTIONS 1 AND 2: 0
2. FEELING DOWN, DEPRESSED OR HOPELESS: NOT AT ALL
1. LITTLE INTEREST OR PLEASURE IN DOING THINGS: NOT AT ALL

## 2024-12-19 NOTE — PROGRESS NOTES
"Subjective   Patient ID: Carlos Barrientos \"Julio César\" is a 59 y.o. female who presents for Weight Check (Weight check for zepbound).  She thinks it is helping, no side effects      Histories reviewed      Objective   /79   Pulse 67   Ht 1.695 m (5' 6.75\")   Wt 88.5 kg (195 lb)   SpO2 97%   BMI 30.77 kg/m²    Physical Exam    Alert adult, NAD  Affect pleasant  Heart RRR no murmur  Lungs CTA bilat      Assessment & Plan  Obesity (BMI 30.0-34.9)  Increase dose to 5mg, follow up 3 months  Orders:    tirzepatide, weight loss, (Zepbound) 5 mg/0.5 mL injection; Inject 5 mg under the skin every 7 days.      "

## 2025-03-20 ENCOUNTER — APPOINTMENT (OUTPATIENT)
Dept: PRIMARY CARE | Facility: CLINIC | Age: 60
End: 2025-03-20
Payer: COMMERCIAL

## 2025-03-20 VITALS
HEART RATE: 78 BPM | OXYGEN SATURATION: 97 % | SYSTOLIC BLOOD PRESSURE: 116 MMHG | DIASTOLIC BLOOD PRESSURE: 62 MMHG | WEIGHT: 185.6 LBS | BODY MASS INDEX: 29.29 KG/M2

## 2025-03-20 DIAGNOSIS — E66.811 OBESITY (BMI 30.0-34.9): ICD-10-CM

## 2025-03-20 PROCEDURE — 99213 OFFICE O/P EST LOW 20 MIN: CPT | Performed by: FAMILY MEDICINE

## 2025-03-20 PROCEDURE — 1036F TOBACCO NON-USER: CPT | Performed by: FAMILY MEDICINE

## 2025-03-20 PROCEDURE — G2211 COMPLEX E/M VISIT ADD ON: HCPCS | Performed by: FAMILY MEDICINE

## 2025-03-20 NOTE — PROGRESS NOTES
"Subjective   Patient ID: Carlos Barrientos \"Julio César\" is a 59 y.o. female who presents for Weight Check (Follow up for Zepbound. ).  She lost 10 pounds in the first 3 months on the medication, and another 10 pounds in the past 3 months.  She would like to stay on it until she gets closer to her normal BMI.  She denies any side effects.    Histories reviewed      Objective   /62   Pulse 78   Wt 84.2 kg (185 lb 9.6 oz)   SpO2 97%   BMI 29.29 kg/m²    Physical Exam    Alert adult, NAD  Affect pleasant  Heart RRR no murmur  Lungs CTA bilat    Assessment & Plan  Obesity (BMI 30.0-34.9)  She will stay on the same dose for now and I will see her back in 3 months.  Orders:    tirzepatide, weight loss, (Zepbound) 5 mg/0.5 mL injection; Inject 5 mg under the skin every 7 days.      "

## 2025-06-26 ENCOUNTER — APPOINTMENT (OUTPATIENT)
Dept: PRIMARY CARE | Facility: CLINIC | Age: 60
End: 2025-06-26
Payer: COMMERCIAL

## 2025-06-26 VITALS
DIASTOLIC BLOOD PRESSURE: 68 MMHG | WEIGHT: 186.2 LBS | BODY MASS INDEX: 29.38 KG/M2 | SYSTOLIC BLOOD PRESSURE: 120 MMHG | HEART RATE: 76 BPM | OXYGEN SATURATION: 98 %

## 2025-06-26 DIAGNOSIS — Z13.220 SCREENING FOR HYPERLIPIDEMIA: Primary | ICD-10-CM

## 2025-06-26 DIAGNOSIS — Z13.1 SCREENING FOR DIABETES MELLITUS: ICD-10-CM

## 2025-06-26 DIAGNOSIS — E66.811 OBESITY (BMI 30.0-34.9): ICD-10-CM

## 2025-06-26 PROCEDURE — 99213 OFFICE O/P EST LOW 20 MIN: CPT | Performed by: FAMILY MEDICINE

## 2025-06-26 RX ORDER — TIRZEPATIDE 7.5 MG/.5ML
7.5 INJECTION, SOLUTION SUBCUTANEOUS WEEKLY
Qty: 2 ML | Refills: 2 | Status: SHIPPED | OUTPATIENT
Start: 2025-06-26

## 2025-06-26 ASSESSMENT — PATIENT HEALTH QUESTIONNAIRE - PHQ9
2. FEELING DOWN, DEPRESSED OR HOPELESS: NOT AT ALL
SUM OF ALL RESPONSES TO PHQ9 QUESTIONS 1 AND 2: 0
1. LITTLE INTEREST OR PLEASURE IN DOING THINGS: NOT AT ALL

## 2025-06-26 NOTE — PROGRESS NOTES
"Subjective   Patient ID: Carlos Barrientos \"Julio César\" is a 60 y.o. female who presents for Weight Check (Follow up and weight check. ).  She is using mounjaro with good results.  Reviewed dosing.  No side effects.  No new issues.      Histories reviewed      Objective   /68   Pulse 76   Wt 84.5 kg (186 lb 3.2 oz)   SpO2 98%   BMI 29.38 kg/m²    Physical Exam    Alert adult, NAD  Affect pleasant  Heart RRR no murmur  Lungs CTA bilat    Assessment & Plan  Obesity (BMI 30.0-34.9)  Follow up 3 months  Orders:    tirzepatide (Mounjaro) 7.5 mg/0.5 mL pen injector; Inject 7.5 mg under the skin 1 (one) time per week.    Screening for hyperlipidemia    Orders:    Lipid Panel; Future    Screening for diabetes mellitus    Orders:    Hemoglobin A1C; Future      "

## 2025-07-04 ENCOUNTER — PATIENT MESSAGE (OUTPATIENT)
Dept: PRIMARY CARE | Facility: CLINIC | Age: 60
End: 2025-07-04
Payer: COMMERCIAL

## 2025-07-04 DIAGNOSIS — E66.811 OBESITY (BMI 30.0-34.9): Primary | ICD-10-CM

## 2025-08-14 LAB
CHOLEST SERPL-MCNC: 189 MG/DL
CHOLEST/HDLC SERPL: 2.7 (CALC)
EST. AVERAGE GLUCOSE BLD GHB EST-MCNC: 111 MG/DL
EST. AVERAGE GLUCOSE BLD GHB EST-SCNC: 6.2 MMOL/L
HBA1C MFR BLD: 5.5 %
HDLC SERPL-MCNC: 70 MG/DL
LDLC SERPL CALC-MCNC: 100 MG/DL (CALC)
NONHDLC SERPL-MCNC: 119 MG/DL (CALC)
TRIGL SERPL-MCNC: 98 MG/DL

## 2025-08-15 ENCOUNTER — APPOINTMENT (OUTPATIENT)
Dept: PRIMARY CARE | Facility: CLINIC | Age: 60
End: 2025-08-15
Payer: COMMERCIAL

## 2025-08-15 VITALS
OXYGEN SATURATION: 97 % | DIASTOLIC BLOOD PRESSURE: 68 MMHG | HEIGHT: 66 IN | SYSTOLIC BLOOD PRESSURE: 120 MMHG | HEART RATE: 78 BPM | WEIGHT: 182.6 LBS | BODY MASS INDEX: 29.35 KG/M2

## 2025-08-15 DIAGNOSIS — E78.2 MIXED HYPERLIPIDEMIA: ICD-10-CM

## 2025-08-15 DIAGNOSIS — E66.811 OBESITY (BMI 30.0-34.9): ICD-10-CM

## 2025-08-15 DIAGNOSIS — G25.81 RESTLESS LEGS: ICD-10-CM

## 2025-08-15 DIAGNOSIS — F33.0 MILD EPISODE OF RECURRENT MAJOR DEPRESSIVE DISORDER: ICD-10-CM

## 2025-08-15 DIAGNOSIS — Z00.00 WELL ADULT EXAM: Primary | ICD-10-CM

## 2025-08-15 PROCEDURE — 90471 IMMUNIZATION ADMIN: CPT | Performed by: FAMILY MEDICINE

## 2025-08-15 PROCEDURE — 1036F TOBACCO NON-USER: CPT | Performed by: FAMILY MEDICINE

## 2025-08-15 PROCEDURE — 3008F BODY MASS INDEX DOCD: CPT | Performed by: FAMILY MEDICINE

## 2025-08-15 PROCEDURE — 99396 PREV VISIT EST AGE 40-64: CPT | Performed by: FAMILY MEDICINE

## 2025-08-15 PROCEDURE — 90677 PCV20 VACCINE IM: CPT | Performed by: FAMILY MEDICINE

## 2025-08-15 PROCEDURE — 99214 OFFICE O/P EST MOD 30 MIN: CPT | Performed by: FAMILY MEDICINE

## 2025-08-15 RX ORDER — CITALOPRAM 40 MG/1
40 TABLET ORAL DAILY
Qty: 90 TABLET | Refills: 3 | Status: SHIPPED | OUTPATIENT
Start: 2025-08-15

## 2025-08-15 RX ORDER — BUPROPION HYDROCHLORIDE 150 MG/1
150 TABLET ORAL DAILY
Qty: 90 TABLET | Refills: 3 | Status: SHIPPED | OUTPATIENT
Start: 2025-08-15

## 2025-08-15 RX ORDER — ATORVASTATIN CALCIUM 10 MG/1
10 TABLET, FILM COATED ORAL DAILY
Qty: 90 TABLET | Refills: 3 | Status: SHIPPED | OUTPATIENT
Start: 2025-08-15 | End: 2026-08-10

## 2025-08-15 RX ORDER — PRAMIPEXOLE DIHYDROCHLORIDE 1 MG/1
0.5 TABLET ORAL NIGHTLY
Qty: 90 TABLET | Refills: 3 | Status: SHIPPED | OUTPATIENT
Start: 2025-08-15

## 2025-08-15 ASSESSMENT — PATIENT HEALTH QUESTIONNAIRE - PHQ9
2. FEELING DOWN, DEPRESSED OR HOPELESS: SEVERAL DAYS
SUM OF ALL RESPONSES TO PHQ9 QUESTIONS 1 AND 2: 2
1. LITTLE INTEREST OR PLEASURE IN DOING THINGS: SEVERAL DAYS

## 2025-12-15 ENCOUNTER — APPOINTMENT (OUTPATIENT)
Dept: PRIMARY CARE | Facility: CLINIC | Age: 60
End: 2025-12-15
Payer: COMMERCIAL